# Patient Record
Sex: FEMALE | Race: WHITE | NOT HISPANIC OR LATINO | URBAN - METROPOLITAN AREA
[De-identification: names, ages, dates, MRNs, and addresses within clinical notes are randomized per-mention and may not be internally consistent; named-entity substitution may affect disease eponyms.]

---

## 2023-01-11 ENCOUNTER — TELEPHONE (OUTPATIENT)
Dept: OBSTETRICS AND GYNECOLOGY | Facility: CLINIC | Age: 23
End: 2023-01-11

## 2023-01-11 NOTE — TELEPHONE ENCOUNTER
Called Blake and advised new gyn Medicaid appointments are very limited and are booked until the end of 2023 or later. Advised patient can try [facility] at [phone], [facility] at [phone] or [facility] at [phone].  Blake verbalized understanding.

## 2023-01-11 NOTE — TELEPHONE ENCOUNTER
----- Message from Shashi Burton sent at 1/11/2023  3:14 PM CST -----  Contact: Blake MCCLAIN/[facility]  Blake would like a call back at [phone] in regards to faxing over a referral for patient and would like to know if it was received.  Thanks  Am

## 2024-10-11 ENCOUNTER — TELEPHONE (OUTPATIENT)
Dept: OBSTETRICS AND GYNECOLOGY | Facility: CLINIC | Age: 24
End: 2024-10-11
Payer: MEDICAID

## 2024-10-11 NOTE — TELEPHONE ENCOUNTER
Spoke with pt and notified that she will not need today's appointment due to her being pregnant. Pt verbalized understanding.